# Patient Record
Sex: MALE | Race: WHITE | HISPANIC OR LATINO | Employment: PART TIME | URBAN - METROPOLITAN AREA
[De-identification: names, ages, dates, MRNs, and addresses within clinical notes are randomized per-mention and may not be internally consistent; named-entity substitution may affect disease eponyms.]

---

## 2022-09-14 ENCOUNTER — OFFICE VISIT (OUTPATIENT)
Dept: FAMILY MEDICINE CLINIC | Facility: CLINIC | Age: 37
End: 2022-09-14

## 2022-09-14 ENCOUNTER — APPOINTMENT (OUTPATIENT)
Dept: LAB | Facility: CLINIC | Age: 37
End: 2022-09-14
Payer: COMMERCIAL

## 2022-09-14 VITALS
HEART RATE: 55 BPM | BODY MASS INDEX: 28.76 KG/M2 | HEIGHT: 73 IN | WEIGHT: 217 LBS | RESPIRATION RATE: 18 BRPM | DIASTOLIC BLOOD PRESSURE: 78 MMHG | OXYGEN SATURATION: 96 % | SYSTOLIC BLOOD PRESSURE: 116 MMHG | TEMPERATURE: 97.9 F

## 2022-09-14 DIAGNOSIS — N50.89 TESTICULAR SWELLING: Primary | ICD-10-CM

## 2022-09-14 DIAGNOSIS — Z00.00 HEALTH CARE MAINTENANCE: ICD-10-CM

## 2022-09-14 DIAGNOSIS — N50.89 TESTICULAR SWELLING: ICD-10-CM

## 2022-09-14 LAB — HCV AB SER QL: NORMAL

## 2022-09-14 PROCEDURE — 86803 HEPATITIS C AB TEST: CPT

## 2022-09-14 PROCEDURE — 99213 OFFICE O/P EST LOW 20 MIN: CPT | Performed by: FAMILY MEDICINE

## 2022-09-14 PROCEDURE — 87389 HIV-1 AG W/HIV-1&-2 AB AG IA: CPT

## 2022-09-14 PROCEDURE — 86592 SYPHILIS TEST NON-TREP QUAL: CPT

## 2022-09-14 PROCEDURE — 36415 COLL VENOUS BLD VENIPUNCTURE: CPT

## 2022-09-14 NOTE — ASSESSMENT & PLAN NOTE
Painless swelling  Left greater than right  Negative for any hernia on examination      Plan  -will conduct urgent ultrasound  -obtain labs including urinalysis, RPR HIV syphilis  -will will have patient come back in a week and do cremaster reflex and follow-up on ultrasound

## 2022-09-14 NOTE — PROGRESS NOTES
Assessment/Plan:    1  Testicular swelling  Assessment & Plan:  Painless swelling  Left greater than right  Negative for any hernia on examination  Plan  -will conduct urgent ultrasound  -obtain labs including urinalysis, RPR HIV syphilis  -will will have patient come back in a week and do cremaster reflex and follow-up on ultrasound      Orders:  -     HIV 1/2 ANTIGEN/ANTIBODY (4TH GENERATION) W REFLEX [facility]; Future  -     Chlamydia/GC amplified DNA by PCR; Future  -     Hepatitis C antibody; Future  -     US scrotum and testicles; Future; Expected date: 09/14/2022    2  Health care maintenance  -   HIV  -hepatitis-C    Subjective:      Patient ID: Antwon Peguero is a 29 y o  male  Past medical history notable for bilateral inguinal hernia previously repaired 7 years ago common in for left painless testicular swelling meds been going on for a period of a week  Patient became concerned and came in for initial evaluation  Patient reports that have only 1 sexual partner  Patient denies any dysuria, blood in urine, urinary frequency  Patient denies painful ejaculations  Patient denies any discharge  The following portions of the patient's history were reviewed and updated as appropriate: allergies, current medications, past family history, past medical history, past social history, past surgical history, and problem list     Review of Systems   Constitutional: Negative for chills and fever  HENT: Negative for ear pain and sore throat  Eyes: Negative for pain and visual disturbance  Respiratory: Negative for cough and shortness of breath  Cardiovascular: Negative for chest pain and palpitations  Gastrointestinal: Negative for abdominal pain and vomiting  Genitourinary: Positive for scrotal swelling  Negative for decreased urine volume, dysuria, hematuria, penile discharge, penile pain, penile swelling, testicular pain and urgency     Musculoskeletal: Negative for arthralgias and back pain    Skin: Negative for color change and rash  Neurological: Negative for seizures and syncope  All other systems reviewed and are negative  Objective:      /78 (BP Location: Left arm, Patient Position: Sitting, Cuff Size: Adult)   Pulse 55   Temp 97 9 °F (36 6 °C) (Temporal)   Resp 18   Ht 6' 1" (1 854 m)   Wt 98 4 kg (217 lb)   SpO2 96%   BMI 28 63 kg/m²          Physical Exam  Constitutional:       General: He is not in acute distress  Appearance: Normal appearance  He is not toxic-appearing or diaphoretic  HENT:      Head: Normocephalic  Mouth/Throat:      Mouth: Mucous membranes are moist    Eyes:      Extraocular Movements: Extraocular movements intact  Pupils: Pupils are equal, round, and reactive to light  Cardiovascular:      Rate and Rhythm: Normal rate and regular rhythm  Pulmonary:      Effort: Pulmonary effort is normal  No tachypnea  Breath sounds: Normal breath sounds  No wheezing or rales  Chest:      Chest wall: No edema  There is no dullness to percussion  Abdominal:      General: Abdomen is flat  There is no distension  Palpations: Abdomen is soft  Tenderness: There is no abdominal tenderness  Genitourinary:     Penis: No hypospadias, erythema, tenderness, swelling or lesions  Testes:         Right: Mass or tenderness not present  Left: Swelling present  Mass or tenderness not present  Left testis is descended  Comments: Significant testicular swelling present on the left side more so than right  Musculoskeletal:      Right lower leg: No edema  Left lower leg: No edema  Skin:     Capillary Refill: Capillary refill takes less than 2 seconds  Neurological:      General: No focal deficit present  Mental Status: He is alert and oriented to person, place, and time     Psychiatric:         Mood and Affect: Mood normal          Behavior: Behavior normal            [facility], DO   Family Medicine PGY-1   9/14/2022

## 2022-09-15 ENCOUNTER — APPOINTMENT (OUTPATIENT)
Dept: LAB | Facility: CLINIC | Age: 37
End: 2022-09-15
Payer: COMMERCIAL

## 2022-09-15 ENCOUNTER — HOSPITAL ENCOUNTER (OUTPATIENT)
Dept: ULTRASOUND IMAGING | Facility: HOSPITAL | Age: 37
End: 2022-09-15
Payer: COMMERCIAL

## 2022-09-15 DIAGNOSIS — N50.89 TESTICULAR SWELLING: ICD-10-CM

## 2022-09-15 DIAGNOSIS — Z00.00 HEALTH CARE MAINTENANCE: ICD-10-CM

## 2022-09-15 LAB
HIV 1+2 AB+HIV1 P24 AG SERPL QL IA: NORMAL
RPR SER QL: NORMAL

## 2022-09-15 PROCEDURE — 81003 URINALYSIS AUTO W/O SCOPE: CPT | Performed by: STUDENT IN AN ORGANIZED HEALTH CARE EDUCATION/TRAINING PROGRAM

## 2022-09-15 PROCEDURE — 87591 N.GONORRHOEAE DNA AMP PROB: CPT

## 2022-09-15 PROCEDURE — 76870 US EXAM SCROTUM: CPT

## 2022-09-15 PROCEDURE — 87491 CHLMYD TRACH DNA AMP PROBE: CPT

## 2022-09-15 PROCEDURE — 87661 TRICHOMONAS VAGINALIS AMPLIF: CPT

## 2022-09-16 LAB
BILIRUB UR QL STRIP: NEGATIVE
C TRACH DNA SPEC QL NAA+PROBE: NEGATIVE
CLARITY UR: CLEAR
COLOR UR: COLORLESS
GLUCOSE UR STRIP-MCNC: NEGATIVE MG/DL
HGB UR QL STRIP.AUTO: NEGATIVE
KETONES UR STRIP-MCNC: NEGATIVE MG/DL
LEUKOCYTE ESTERASE UR QL STRIP: NEGATIVE
N GONORRHOEA DNA SPEC QL NAA+PROBE: NEGATIVE
NITRITE UR QL STRIP: NEGATIVE
PH UR STRIP.AUTO: 6 [PH]
PROT UR STRIP-MCNC: NEGATIVE MG/DL
SP GR UR STRIP.AUTO: 1.01 (ref 1–1.03)
UROBILINOGEN UR STRIP-ACNC: <2 MG/DL

## 2022-09-21 LAB — T VAGINALIS RRNA SPEC QL NAA+PROBE: NEGATIVE

## 2022-10-13 ENCOUNTER — TELEPHONE (OUTPATIENT)
Dept: FAMILY MEDICINE CLINIC | Facility: CLINIC | Age: 37
End: 2022-10-13

## 2022-10-13 NOTE — TELEPHONE ENCOUNTER
I have attempted to contact this patient by phone with the following results: left message to return my call on answering machine  Called to confirm appointment but voicemail was full

## 2022-10-14 ENCOUNTER — OFFICE VISIT (OUTPATIENT)
Dept: FAMILY MEDICINE CLINIC | Facility: CLINIC | Age: 37
End: 2022-10-14

## 2022-10-14 DIAGNOSIS — N50.89 TESTICULAR SWELLING: Primary | ICD-10-CM

## 2022-10-14 DIAGNOSIS — Z23 ENCOUNTER FOR IMMUNIZATION: ICD-10-CM

## 2022-10-14 DIAGNOSIS — N52.9 ERECTILE DYSFUNCTION, UNSPECIFIED ERECTILE DYSFUNCTION TYPE: ICD-10-CM

## 2022-10-14 PROCEDURE — 90472 IMMUNIZATION ADMIN EACH ADD: CPT | Performed by: FAMILY MEDICINE

## 2022-10-14 PROCEDURE — 90682 RIV4 VACC RECOMBINANT DNA IM: CPT | Performed by: FAMILY MEDICINE

## 2022-10-14 PROCEDURE — 90471 IMMUNIZATION ADMIN: CPT | Performed by: FAMILY MEDICINE

## 2022-10-14 PROCEDURE — 99213 OFFICE O/P EST LOW 20 MIN: CPT | Performed by: FAMILY MEDICINE

## 2022-10-14 PROCEDURE — 90715 TDAP VACCINE 7 YRS/> IM: CPT | Performed by: FAMILY MEDICINE

## 2022-10-14 RX ORDER — SILDENAFIL 25 MG/1
25 TABLET, FILM COATED ORAL DAILY PRN
Qty: 90 TABLET | Refills: 3 | Status: SHIPPED | OUTPATIENT
Start: 2022-10-14

## 2022-10-15 PROBLEM — Z23 ENCOUNTER FOR IMMUNIZATION: Status: ACTIVE | Noted: 2022-10-15

## 2022-10-15 NOTE — PROGRESS NOTES
Assessment/Plan:    1  Testicular swelling  Assessment & Plan:  Ultrasound positive for Prominent bilateral epididymal cysts    Small bilateral hydroceles with particulate debris  Symptomatic with episodic pain  Now erectile dysfunction though not related  Plan  -that this time appropriate to put in for urology referral for further evaluation and recommendations  -will give patient 5 aggregate in the interim to help with the erectile dysfunction  -strict return precaution including significant testicular pain/abdominal pain given to patient request setting of the cysts    Orders:  -     Ambulatory Referral to Urology; Future    2  Erectile dysfunction, unspecified erectile dysfunction type  -     sildenafil (VIAGRA) 25 MG tablet; Take 1 tablet (25 mg total) by mouth daily as needed for erectile dysfunction    3  Encounter for immunization  -     TDAP VACCINE GREATER THAN OR EQUAL TO 8YO IM  -     influenza vaccine, quadrivalent, recombinant, PF, 0 5 mL, for patients 18 yr+ (FLUBLOK)       Subjective:      Patient ID: Tanner Augustine is a 29 y o  male  No significant past medical history come in with follow-up visit regarding swelling of his testicles  Per last visit, we obtained workup including ultrasound, STD checks, and urine analysis  STD checks and urinalysis were negative  Ultrasound was positive for   Prominent bilateral epididymal cysts  Small bilateral hydroceles with particulate debris  Patient reports that he has had increasing episodes of brief pain in his testicles  That are sharp however subsiding  Patient also reports that he has had erectile dysfunction though he continues to have morning erections  Patient denies any fevers, chills or any current testicular pain  Patient denies any unintentional weight loss        The following portions of the patient's history were reviewed and updated as appropriate: allergies, current medications, past family history, past medical history, past social history, past surgical history, and problem list     Review of Systems   Constitutional: Negative for chills and fever  HENT: Negative for ear pain and sore throat  Eyes: Negative for pain and visual disturbance  Respiratory: Negative for cough and shortness of breath  Cardiovascular: Negative for chest pain and palpitations  Gastrointestinal: Negative for abdominal pain and vomiting  Genitourinary: Negative for dysuria and hematuria  Musculoskeletal: Negative for arthralgias and back pain  Skin: Negative for color change and rash  Neurological: Negative for seizures and syncope  All other systems reviewed and are negative  Objective: There were no vitals taken for this visit  Physical Exam  Constitutional:       General: He is not in acute distress  Appearance: Normal appearance  He is not toxic-appearing or diaphoretic  HENT:      Head: Normocephalic  Mouth/Throat:      Mouth: Mucous membranes are moist    Eyes:      Extraocular Movements: Extraocular movements intact  Pupils: Pupils are equal, round, and reactive to light  Cardiovascular:      Rate and Rhythm: Normal rate and regular rhythm  Pulmonary:      Effort: Pulmonary effort is normal  No tachypnea  Breath sounds: Normal breath sounds  No wheezing or rales  Chest:      Chest wall: No edema  There is no dullness to percussion  Abdominal:      General: Abdomen is flat  There is no distension  Palpations: Abdomen is soft  Tenderness: There is no abdominal tenderness  Musculoskeletal:      Right lower leg: No edema  Left lower leg: No edema  Skin:     Capillary Refill: Capillary refill takes less than 2 seconds  Neurological:      General: No focal deficit present  Mental Status: He is alert and oriented to person, place, and time     Psychiatric:         Mood and Affect: Mood normal          Behavior: Behavior normal            Kimberli STEWART Family Medicine PGY-1   10/21/2022

## 2022-10-15 NOTE — ASSESSMENT & PLAN NOTE
Ultrasound positive for Prominent bilateral epididymal cysts    Small bilateral hydroceles with particulate debris  Symptomatic with episodic pain  Now erectile dysfunction though not related      Plan  -that this time appropriate to put in for urology referral for further evaluation and recommendations  -will give patient 5 aggregate in the interim to help with the erectile dysfunction  -strict return precaution including significant testicular pain/abdominal pain given to patient request setting of the cysts

## 2022-10-21 PROBLEM — N52.9 ERECTILE DYSFUNCTION: Status: ACTIVE | Noted: 2022-10-21

## 2024-02-19 ENCOUNTER — HOSPITAL ENCOUNTER (EMERGENCY)
Facility: HOSPITAL | Age: 39
Discharge: HOME | End: 2024-02-19
Attending: EMERGENCY MEDICINE

## 2024-02-19 VITALS
WEIGHT: 220.3 LBS | TEMPERATURE: 97.9 F | SYSTOLIC BLOOD PRESSURE: 158 MMHG | BODY MASS INDEX: 29.07 KG/M2 | HEART RATE: 64 BPM | OXYGEN SATURATION: 98 % | DIASTOLIC BLOOD PRESSURE: 86 MMHG | RESPIRATION RATE: 20 BRPM

## 2024-02-19 DIAGNOSIS — S01.81XA FACIAL LACERATION, INITIAL ENCOUNTER: Primary | ICD-10-CM

## 2024-02-19 PROCEDURE — 99282 EMERGENCY DEPT VISIT SF MDM: CPT

## 2024-02-19 PROCEDURE — 90715 TDAP VACCINE 7 YRS/> IM: CPT | Performed by: EMERGENCY MEDICINE

## 2024-02-19 PROCEDURE — 99284 EMERGENCY DEPT VISIT MOD MDM: CPT | Performed by: EMERGENCY MEDICINE

## 2024-02-19 PROCEDURE — 90471 IMMUNIZATION ADMIN: CPT

## 2024-02-19 PROCEDURE — 12011 RPR F/E/E/N/L/M 2.5 CM/<: CPT | Performed by: EMERGENCY MEDICINE

## 2024-02-19 RX ORDER — TETRACAINE HYDROCHLORIDE 5 MG/ML
2 SOLUTION OPHTHALMIC ONCE
Status: COMPLETED | OUTPATIENT
Start: 2024-02-19 | End: 2024-02-19

## 2024-02-19 RX ADMIN — TETANUS TOXOID, REDUCED DIPHTHERIA TOXOID AND ACELLULAR PERTUSSIS VACCINE, ADSORBED 0.5 ML: 5; 2.5; 8; 8; 2.5 SUSPENSION INTRAMUSCULAR at 19:26

## 2024-02-19 RX ADMIN — TETRACAINE HYDROCHLORIDE 2 DROP: 5 SOLUTION OPHTHALMIC at 19:21

## 2024-02-19 RX ADMIN — FLUORESCEIN SODIUM 1 STRIP: 1 STRIP OPHTHALMIC at 19:21

## 2024-02-20 NOTE — ED PROCEDURE NOTE
"PROCEDURE  Universal Protocol:  Consent: Verbal consent obtained.  Risks and benefits: risks, benefits and alternatives were discussed  Consent given by: patient  Time out: Immediately prior to procedure a \"time out\" was called to verify the correct patient, procedure, equipment, support staff and site/side marked as required.  Timeout called at: 2/19/2024 7:21 PM.  Patient understanding: patient states understanding of the procedure being performed  Patient identity confirmed: verbally with patient  Laceration repair    Date/Time: 2/19/2024 7:21 PM    Performed by: Rai Cuevas MD  Authorized by: Rai Cuevas MD  Body area: head/neck  Location details: forehead  Laceration length: 0.5 cm  Tendon involvement: none  Nerve involvement: none  Vascular damage: no    Sedation:  Patient sedated: no      Wound Dehiscence:  Superficial Wound Dehiscence: simple closure      Procedure Details:  Irrigation solution: saline  Irrigation method: syringe  Amount of cleaning: standard  Debridement: none  Degree of undermining: none  Skin closure: glue  Patient tolerance: Patient tolerated the procedure well with no immediate complications           Rai Cuevas MD  02/19/24 1921    "

## 2024-02-20 NOTE — ED PROVIDER NOTES
History  Chief Complaint   Patient presents with    Head Injury     Struck in head with piece of metal at home.  No LOC.     Patient is a 38-year-old male who presents at the request of his wife.  Moving items out of the basement today.  Piece of metal struck him in the face.  +laceration.  Last tetanus unknown.  +FB sensation in eye.  No issues with vision.  No n/v, headache, numbness/tingling.         Prior to Admission Medications   Prescriptions Last Dose Informant Patient Reported? Taking?   sildenafil (VIAGRA) 25 MG tablet   No No   Sig: Take 1 tablet (25 mg total) by mouth daily as needed for erectile dysfunction      Facility-Administered Medications: None       No past medical history on file.    Past Surgical History:   Procedure Laterality Date    HERNIA REPAIR Bilateral        Family History   Problem Relation Age of Onset    Heart disease Mother      I have reviewed and agree with the history as documented.    E-Cigarette/Vaping    E-Cigarette Use Never User      E-Cigarette/Vaping Substances     Social History     Tobacco Use    Smoking status: Never    Smokeless tobacco: Never   Vaping Use    Vaping status: Never Used   Substance Use Topics    Alcohol use: Never    Drug use: Never       Review of Systems   Constitutional: Negative.    HENT: Negative.     Eyes:  Positive for pain.   Respiratory: Negative.     Cardiovascular: Negative.    Gastrointestinal: Negative.    Endocrine: Negative.    Genitourinary: Negative.    Musculoskeletal: Negative.    Skin:  Positive for wound.   Allergic/Immunologic: Negative.    Neurological: Negative.    Hematological: Negative.    Psychiatric/Behavioral: Negative.     All other systems reviewed and are negative.      Physical Exam  Physical Exam  Vitals and nursing note reviewed.   Constitutional:       Appearance: Normal appearance. He is normal weight.   HENT:      Head: Normocephalic and atraumatic.      Right Ear: Tympanic membrane, ear canal and external ear  normal.      Left Ear: Tympanic membrane, ear canal and external ear normal.      Nose: Nose normal.      Mouth/Throat:      Mouth: Mucous membranes are moist.      Pharynx: Oropharynx is clear.   Eyes:      General:         Right eye: No foreign body or discharge.         Left eye: No foreign body or discharge.      Extraocular Movements: Extraocular movements intact.      Conjunctiva/sclera: Conjunctivae normal.      Right eye: Right conjunctiva is not injected. No chemosis, exudate or hemorrhage.     Pupils: Pupils are equal, round, and reactive to light.      Comments: Stained with fluro.  No corneal abrasion.    Cardiovascular:      Rate and Rhythm: Normal rate and regular rhythm.      Pulses: Normal pulses.      Heart sounds: Normal heart sounds.   Pulmonary:      Breath sounds: Normal breath sounds.   Abdominal:      General: Bowel sounds are normal.      Palpations: Abdomen is soft.   Musculoskeletal:      Cervical back: Normal range of motion and neck supple.   Skin:     Comments: 0.5 cm linear laceration with minimal gap lateral to right lateral canthus.    Neurological:      Mental Status: He is alert.         Vital Signs  ED Triage Vitals [02/19/24 1900]   Temperature Pulse Respirations Blood Pressure SpO2   97.9 °F (36.6 °C) 64 20 158/86 98 %      Temp Source Heart Rate Source Patient Position - Orthostatic VS BP Location FiO2 (%)   Tympanic Monitor Sitting Left arm --      Pain Score       --           Vitals:    02/19/24 1900   BP: 158/86   Pulse: 64   Patient Position - Orthostatic VS: Sitting         Visual Acuity  Visual Acuity      Flowsheet Row Most Recent Value   L Pupil Size (mm) 3   R Pupil Size (mm) 3            ED Medications  Medications   fluorescein sodium sterile ophthalmic strip 1 strip (1 strip Right Eye Given 2/19/24 1921)   tetracaine 0.5 % ophthalmic solution 2 drop (2 drops Right Eye Given 2/19/24 1921)   tetanus-diphtheria-acellular pertussis (BOOSTRIX) IM injection 0.5 mL (0.5 mL  Intramuscular Given 2/19/24 1926)       Diagnostic Studies  Results Reviewed       None                   No orders to display              Procedures  Procedures         ED Course                                             Medical Decision Making  Problems Addressed:  Facial laceration, initial encounter:     Details: Repaired.  Tetanus provided.     Risk  Prescription drug management.             Disposition  Final diagnoses:   Facial laceration, initial encounter     Time reflects when diagnosis was documented in both MDM as applicable and the Disposition within this note       Time User Action Codes Description Comment    2/19/2024  7:21 PM Rai Cuevas Add [S01.81XA] Facial laceration, initial encounter           ED Disposition       ED Disposition   Discharge    Condition   Stable    Date/Time   Mon Feb 19, 2024  7:21 PM    Comment   Kenyetta Mookie discharge to home/self care.                   Follow-up Information       Follow up With Specialties Details Why Contact Info Additional Information    [address] [address] [phone] [facility], [address] [place] [address], [address] [phone]            Discharge Medication List as of 2/19/2024  7:21 PM        CONTINUE these medications which have NOT CHANGED    Details   sildenafil (VIAGRA) 25 MG tablet Take 1 tablet (25 mg total) by mouth daily as needed for erectile dysfunction, Starting Fri 10/14/2022, Normal             No discharge procedures on file.    PDMP Review       None            ED Provider  Electronically Signed by             Rai Cuevas MD  02/19/24 1951

## 2024-09-30 ENCOUNTER — APPOINTMENT (EMERGENCY)
Dept: RADIOLOGY | Facility: HOSPITAL | Age: 39
End: 2024-09-30
Payer: COMMERCIAL

## 2024-09-30 ENCOUNTER — HOSPITAL ENCOUNTER (EMERGENCY)
Facility: HOSPITAL | Age: 39
Discharge: HOME | End: 2024-09-30
Attending: EMERGENCY MEDICINE | Admitting: EMERGENCY MEDICINE
Payer: COMMERCIAL

## 2024-09-30 VITALS
RESPIRATION RATE: 18 BRPM | DIASTOLIC BLOOD PRESSURE: 81 MMHG | TEMPERATURE: 97.6 F | SYSTOLIC BLOOD PRESSURE: 163 MMHG | BODY MASS INDEX: 29.16 KG/M2 | WEIGHT: 221 LBS | OXYGEN SATURATION: 99 % | HEART RATE: 73 BPM

## 2024-09-30 DIAGNOSIS — S60.00XA FINGER CONTUSION: Primary | ICD-10-CM

## 2024-09-30 PROCEDURE — 99284 EMERGENCY DEPT VISIT MOD MDM: CPT | Performed by: EMERGENCY MEDICINE

## 2024-09-30 PROCEDURE — 73140 X-RAY EXAM OF FINGER(S): CPT

## 2024-09-30 PROCEDURE — 99283 EMERGENCY DEPT VISIT LOW MDM: CPT

## 2024-10-02 NOTE — ED PROVIDER NOTES
Final diagnoses:   Finger contusion     ED Disposition       ED Disposition   Discharge    Condition   Stable    Date/Time   Mon Sep 30, 2024 11:03 PM    Comment   Kenyetta Damico discharge to home/self care.                   Assessment & Plan       Medical Decision Making  38-year-old male presenting emergency department with right pinky, differential diagnose includes fracture, dislocation.  X-ray of the right pinky shows no fracture on my interpretation.  Patient not having a special pain, offered splint but refused.    Amount and/or Complexity of Data Reviewed  Radiology: ordered.             Medications - No data to display    ED Risk Strat Scores                                               History of Present Illness       Chief Complaint   Patient presents with    Finger Injury     Pt reports slamming door on R hand 5th digit on accident.  Area is bruised and swollen.        History reviewed. No pertinent past medical history.   Past Surgical History:   Procedure Laterality Date    HERNIA REPAIR Bilateral       Family History   Problem Relation Age of Onset    Heart disease Mother       Social History     Tobacco Use    Smoking status: Never    Smokeless tobacco: Never   Vaping Use    Vaping status: Never Used   Substance Use Topics    Alcohol use: Never    Drug use: Never      E-Cigarette/Vaping    E-Cigarette Use Never User       E-Cigarette/Vaping Substances      I have reviewed and agree with the history as documented.     38-year-old male presenting to the emergency department with right pinky pain from having slammed it in a door.  Bruised and swollen.        Review of Systems   Constitutional:  Negative for chills and fever.   HENT:  Negative for ear pain and sore throat.    Eyes:  Negative for pain and visual disturbance.   Respiratory:  Negative for cough and shortness of breath.    Cardiovascular:  Negative for chest pain and palpitations.   Gastrointestinal:  Negative for abdominal pain and  vomiting.   Genitourinary:  Negative for dysuria and hematuria.   Musculoskeletal:  Positive for arthralgias. Negative for back pain.   Skin:  Negative for color change and rash.   Neurological:  Negative for seizures and syncope.   All other systems reviewed and are negative.          Objective       ED Triage Vitals [09/30/24 2238]   Temperature Pulse Blood Pressure Respirations SpO2 Patient Position - Orthostatic VS   97.6 °F (36.4 °C) 73 163/81 18 99 % Sitting      Temp Source Heart Rate Source BP Location FiO2 (%) Pain Score    Tympanic Monitor Left arm -- --      Vitals      Date and Time Temp Pulse SpO2 Resp BP Pain Score FACES Pain Rating User   09/30/24 2238 97.6 °F (36.4 °C) 73 99 % 18 163/81 -- -- TR            Physical Exam  Vitals and nursing note reviewed.   Constitutional:       General: He is not in acute distress.     Appearance: He is well-developed.   HENT:      Head: Normocephalic and atraumatic.   Eyes:      Conjunctiva/sclera: Conjunctivae normal.   Pulmonary:      Effort: Pulmonary effort is normal. No respiratory distress.   Abdominal:      Palpations: Abdomen is soft.      Tenderness: There is no abdominal tenderness.   Musculoskeletal:      Cervical back: Neck supple.      Comments: Right fifth digit with distal phalanx swelling and ecchymosis.  No subungual hematoma.   Skin:     General: Skin is warm and dry.      Capillary Refill: Capillary refill takes less than 2 seconds.   Neurological:      Mental Status: He is alert.         Results Reviewed       None            XR finger fifth digit-pinkie RIGHT   Final Interpretation by Faizan Cartwright MD (10/01 0900)      Acute nondisplaced fracture involving the distal phalanx of the fifth digit.         Computerized Assisted Algorithm (CAA) may have been used to analyze all applicable images.               Workstation performed: [id number]             Procedures    ED Medication and Procedure Management   Prior to Admission Medications    Prescriptions Last Dose Informant Patient Reported? Taking?   sildenafil (VIAGRA) 25 MG tablet   No No   Sig: Take 1 tablet (25 mg total) by mouth daily as needed for erectile dysfunction      Facility-Administered Medications: None     Discharge Medication List as of 9/30/2024 11:03 PM        CONTINUE these medications which have NOT CHANGED    Details   sildenafil (VIAGRA) 25 MG tablet Take 1 tablet (25 mg total) by mouth daily as needed for erectile dysfunction, Starting Fri 10/14/2022, Normal           No discharge procedures on file.  ED SEPSIS DOCUMENTATION   Time reflects when diagnosis was documented in both MDM as applicable and the Disposition within this note       Time User Action Codes Description Comment    9/30/2024 11:03 PM Matty Perry Add [S60.00XA] Finger contusion                  Matty Perry MD  10/01/24 7403

## 2025-01-22 ENCOUNTER — TELEPHONE (OUTPATIENT)
Dept: FAMILY MEDICINE CLINIC | Facility: CLINIC | Age: 40
End: 2025-01-22

## 2025-01-23 ENCOUNTER — OFFICE VISIT (OUTPATIENT)
Dept: FAMILY MEDICINE CLINIC | Facility: CLINIC | Age: 40
End: 2025-01-23

## 2025-01-23 VITALS
SYSTOLIC BLOOD PRESSURE: 110 MMHG | TEMPERATURE: 96.9 F | RESPIRATION RATE: 14 BRPM | HEIGHT: 72 IN | HEART RATE: 70 BPM | DIASTOLIC BLOOD PRESSURE: 60 MMHG | BODY MASS INDEX: 30.85 KG/M2 | WEIGHT: 227.8 LBS | OXYGEN SATURATION: 96 %

## 2025-01-23 DIAGNOSIS — Z11.4 SCREENING FOR HIV (HUMAN IMMUNODEFICIENCY VIRUS): ICD-10-CM

## 2025-01-23 DIAGNOSIS — N50.89 TESTICULAR SWELLING: ICD-10-CM

## 2025-01-23 DIAGNOSIS — Z00.00 ANNUAL PHYSICAL EXAM: Primary | ICD-10-CM

## 2025-01-23 DIAGNOSIS — E66.9 OBESITY (BMI 30-39.9): ICD-10-CM

## 2025-01-23 DIAGNOSIS — Z79.899 ENCOUNTER FOR MEDICATION REVIEW: ICD-10-CM

## 2025-01-23 DIAGNOSIS — Z11.59 NEED FOR HEPATITIS C SCREENING TEST: ICD-10-CM

## 2025-01-23 PROBLEM — Z23 ENCOUNTER FOR IMMUNIZATION: Status: RESOLVED | Noted: 2022-10-15 | Resolved: 2025-01-23

## 2025-01-23 LAB
SL AMB  POCT GLUCOSE, UA: NORMAL
SL AMB LEUKOCYTE ESTERASE,UA: NORMAL
SL AMB POCT BILIRUBIN,UA: NORMAL
SL AMB POCT BLOOD,UA: NORMAL
SL AMB POCT CLARITY,UA: CLEAR
SL AMB POCT COLOR,UA: YELLOW
SL AMB POCT KETONES,UA: NORMAL
SL AMB POCT NITRITE,UA: NORMAL
SL AMB POCT PH,UA: 5.5
SL AMB POCT SPECIFIC GRAVITY,UA: 1.03
SL AMB POCT URINE PROTEIN: NORMAL
SL AMB POCT UROBILINOGEN: 0.2

## 2025-01-23 PROCEDURE — 99395 PREV VISIT EST AGE 18-39: CPT | Performed by: FAMILY MEDICINE

## 2025-01-23 PROCEDURE — 81002 URINALYSIS NONAUTO W/O SCOPE: CPT | Performed by: FAMILY MEDICINE

## 2025-01-23 PROCEDURE — 99213 OFFICE O/P EST LOW 20 MIN: CPT | Performed by: FAMILY MEDICINE

## 2025-01-23 NOTE — PROGRESS NOTES
Adult Annual Physical  Name: Kenyetta Damico      : 1985      MRN: [id number]  Encounter Provider: Elba Yadav MD  Encounter Date: 2025   Encounter department: [facility]    Assessment & Plan  Annual physical exam  Has not seen PCP since        Testicular swelling  Bilateral inguinal hernia repair in , and the other .  Ultrasound in  : positive for Prominent bilateral epididymal cysts.  Small bilateral hydroceles with particulate debris.  Referral in  to urology but never went.   Increased in size recently     - repeat US to see for changes   - referral to urology   - given complain of urgency will do urine dipstick: negative.   - educated on potential need of surgery  - advised to wear briefs with extra support   - patient to follow up if symptoms worsen      Orders:    US scrotum and testicles; Future    POCT urine dip    Ambulatory Referral to Urology; Future    Obesity (BMI 30-39.9)  The BMI is above normal.   Nutrition recommendations include reducing portion sizes, decreasing overall calorie intake, 3-5 servings of fruits/vegetables daily, reducing fast food intake, consuming healthier snacks, decreasing soda and/or juice intake, moderation in carbohydrate intake, increasing intake of lean protein, reducing intake of saturated fat and trans fat and reducing intake of cholesterol. Exercise recommendations include moderate aerobic physical activity for 150 minutes/week.      Orders:    Lipid panel; Future    Comprehensive metabolic panel; Future    Hemoglobin A1C; Future    Need for hepatitis C screening test  Last in , low risk given monogamous relationship with female partner but patient  requested testing   Orders:    Hepatitis C antibody; Future    Screening for HIV (human immunodeficiency virus)  Last in , low risk given monogamous relationship with female partner but patient  requested testing   Orders:    HIV 1/2 AG/AB  w Reflex [facility] for 2 yr old and above; Future    Encounter for medication review  Does not take any medications, including OTC meds and supplements        Immunizations and preventive care screenings were discussed with patient today.  Up to date with shots.   Counseling:  Alcohol/drug use: discussed moderation in alcohol intake, the recommendations for healthy alcohol use, and avoidance of illicit drug use.         History of Present Illness     Adult Annual Physical:  Patient presents for annual physical. 39-year-old male with no significant past medical history presents today for annual checkup as well as follow-up on left testicle swelling.  This is a chronic condition for him patient was seen for the same complaint in 2022 when he was referred to urology but did not follow-up with them.   Reports swelling on left testicle associated with intermittent pain and intermittent erythema that has noticed to be worse when standing for prolonged period of time, patient is also feels pain after intercourse he does mention urgency as well.   Patient states that he is unable to maintain erection and was prescribed Viagra in the past however has never used it.  He thinks it is related to the swelling. .     Diet and Physical Activity:  - Diet/Nutrition: well balanced diet and frequent junk food.  - Exercise: no formal exercise.    Depression Screening:  - PHQ-2 Score: 0    General Health:  - Sleep: sleeps well and 4-6 hours of sleep on average.  - Hearing: normal hearing right ear and normal hearing left ear.  - Vision: no vision problems.  - Dental: brushes teeth twice daily and no dental visits for > 1 year.     Health:  - History of STDs: no.   - Urinary symptoms: urinary urgency.     Advanced Care Planning:  - Has an advanced directive?: no    - Has a durable medical POA?: no      Review of Systems   Constitutional:  Negative for chills, diaphoresis, fatigue and fever.   HENT:  Negative for congestion and sore throat.  "   Eyes:  Negative for visual disturbance.   Respiratory:  Negative for shortness of breath and wheezing.    Cardiovascular:  Negative for chest pain, palpitations and leg swelling.   Gastrointestinal:  Negative for blood in stool, constipation, diarrhea, nausea and vomiting.   Genitourinary:  Positive for scrotal swelling, testicular pain and urgency. Negative for difficulty urinating, dysuria, enuresis, frequency, hematuria, penile pain and penile swelling.   Musculoskeletal:  Negative for arthralgias, back pain, joint swelling and myalgias.   Skin:  Negative for color change and rash.   Neurological:  Negative for dizziness, tremors, syncope, light-headedness and headaches.   Psychiatric/Behavioral:  Negative for sleep disturbance and suicidal ideas.          Objective   /60 (BP Location: Left arm, Patient Position: Sitting, Cuff Size: Standard)   Pulse 70   Temp (!) 96.9 °F (36.1 °C) (Temporal)   Resp 14   Ht 5' 11.65\" (1.82 m)   Wt 103 kg (227 lb 12.8 oz)   SpO2 96%   BMI 31.19 kg/m²     Physical Exam  Vitals and nursing note reviewed. Exam conducted with a chaperone present.   Constitutional:       General: He is not in acute distress.     Appearance: He is well-developed.   HENT:      Head: Normocephalic and atraumatic.      Right Ear: Tympanic membrane, ear canal and external ear normal.      Left Ear: Tympanic membrane, ear canal and external ear normal.      Nose: Nose normal. No congestion.      Mouth/Throat:      Mouth: Mucous membranes are moist.   Eyes:      General: No scleral icterus.     Conjunctiva/sclera: Conjunctivae normal.   Cardiovascular:      Rate and Rhythm: Normal rate and regular rhythm.      Heart sounds: No murmur heard.  Pulmonary:      Effort: Pulmonary effort is normal. No respiratory distress.      Breath sounds: Normal breath sounds. No wheezing or rales.   Abdominal:      General: There is no distension.      Palpations: Abdomen is soft. There is no mass.      " Tenderness: There is no abdominal tenderness. There is no guarding.      Hernia: No hernia is present. There is no hernia in the left inguinal area or right inguinal area.   Genitourinary:     Penis: Normal. No tenderness or swelling.       Testes:         Right: Mass, tenderness, swelling or testicular hydrocele not present. Right testis is descended.         Left: Tenderness and testicular hydrocele present. Varicocele not present.   Musculoskeletal:         General: No swelling.      Cervical back: Neck supple. No rigidity or tenderness.      Right lower leg: No edema.      Left lower leg: No edema.   Lymphadenopathy:      Cervical: No cervical adenopathy.      Lower Body: No right inguinal adenopathy. No left inguinal adenopathy.   Skin:     General: Skin is warm and dry.      Capillary Refill: Capillary refill takes less than 2 seconds.      Coloration: Skin is not jaundiced or pale.      Findings: No erythema or rash.   Neurological:      Mental Status: He is alert.   Psychiatric:         Mood and Affect: Mood normal.

## 2025-01-23 NOTE — PATIENT INSTRUCTIONS
"Patient Education     Routine physical for adults   The Basics   Written by the doctors and editors at [facility]   What is a physical? -- A physical is a routine visit, or \"check-up,\" with your doctor. You might also hear it called a \"wellness visit\" or \"preventive visit.\"  During each visit, the doctor will:   Ask about your physical and mental health   Ask about your habits, behaviors, and lifestyle   Do an exam   Give you vaccines if needed   Talk to you about any medicines you take   Give advice about your health   Answer your questions  Getting regular check-ups is an important part of taking care of your health. It can help your doctor find and treat any problems you have. But it's also important for preventing health problems.  A routine physical is different from a \"sick visit.\" A sick visit is when you see a doctor because of a health concern or problem. Since physicals are scheduled ahead of time, you can think about what you want to ask the doctor.  How often should I get a physical? -- It depends on your age and health. In general, for people age 21 years and older:   If you are younger than 50 years, you might be able to get a physical every 3 years.   If you are 50 years or older, your doctor might recommend a physical every year.  If you have an ongoing health condition, like diabetes or high blood pressure, your doctor will probably want to see you more often.  What happens during a physical? -- In general, each visit will include:   Physical exam - The doctor or nurse will check your height, weight, heart rate, and blood pressure. They will also look at your eyes and ears. They will ask about how you are feeling and whether you have any symptoms that bother you.   Medicines - It's a good idea to bring a list of all the medicines you take to each doctor visit. Your doctor will talk to you about your medicines and answer any questions. Tell them if you are having any side effects that bother you. You " "should also tell them if you are having trouble paying for any of your medicines.   Habits and behaviors - This includes:   Your diet   Your exercise habits   Whether you smoke, drink alcohol, or use drugs   Whether you are sexually active   Whether you feel safe at home  Your doctor will talk to you about things you can do to improve your health and lower your risk of health problems. They will also offer help and support. For example, if you want to quit smoking, they can give you advice and might prescribe medicines. If you want to improve your diet or get more physical activity, they can help you with this, too.   Lab tests, if needed - The tests you get will depend on your age and situation. For example, your doctor might want to check your:   Cholesterol   Blood sugar   Iron level   Vaccines - The recommended vaccines will depend on your age, health, and what vaccines you already had. Vaccines are very important because they can prevent certain serious or deadly infections.   Discussion of screening - \"Screening\" means checking for diseases or other health problems before they cause symptoms. Your doctor can recommend screening based on your age, risk, and preferences. This might include tests to check for:   Cancer, such as breast, prostate, cervical, ovarian, colorectal, prostate, lung, or skin cancer   Sexually transmitted infections, such as chlamydia and gonorrhea   Mental health conditions like depression and anxiety  Your doctor will talk to you about the different types of screening tests. They can help you decide which screenings to have. They can also explain what the results might mean.   Answering questions - The physical is a good time to ask the doctor or nurse questions about your health. If needed, they can refer you to other doctors or specialists, too.  Adults older than 65 years often need other care, too. As you get older, your doctor will talk to you about:   How to prevent falling at " home   Hearing or vision tests   Memory testing   How to take your medicines safely   Making sure that you have the help and support you need at home  All topics are updated as new evidence becomes available and our peer review process is complete.  This topic retrieved from [employer] on: May 02, 2024.  Topic 159079 Version 1.0  Release: 32.4.3 - C32.122  © 2024 UpToDate, Inc. and/or its affiliates. All rights reserved.  Consumer Information Use and Disclaimer   Disclaimer: This generalized information is a limited summary of diagnosis, treatment, and/or medication information. It is not meant to be comprehensive and should be used as a tool to help the user understand and/or assess potential diagnostic and treatment options. It does NOT include all information about conditions, treatments, medications, side effects, or risks that may apply to a specific patient. It is not intended to be medical advice or a substitute for the medical advice, diagnosis, or treatment of a health care provider based on the health care provider's examination and assessment of a patient's specific and unique circumstances. Patients must speak with a health care provider for complete information about their health, medical questions, and treatment options, including any risks or benefits regarding use of medications. This information does not endorse any treatments or medications as safe, effective, or approved for treating a specific patient. UpToDate, Inc. and its affiliates disclaim any warranty or liability relating to this information or the use thereof.The use of this information is governed by the Terms of Use, available at https://www.[employer].com/en/know/clinical-effectiveness-terms. 2024© UpToDate, Inc. and its affiliates and/or licensors. All rights reserved.  Copyright   © 2024 UpToDate, Inc. and/or its affiliates. All rights reserved.

## 2025-01-23 NOTE — ASSESSMENT & PLAN NOTE
Bilateral inguinal hernia repair in 2009, and the other 2015.  Ultrasound in 2022 : positive for Prominent bilateral epididymal cysts.  Small bilateral hydroceles with particulate debris.  Referral in 2022 to urology but never went.   Increased in size recently     - repeat US to see for changes   - referral to urology   - given complain of urgency will do urine dipstick: negative.   - educated on potential need of surgery  - advised to wear briefs with extra support   - patient to follow up if symptoms worsen      Orders:    US scrotum and testicles; Future    POCT urine dip    Ambulatory Referral to Urology; Future

## 2025-01-24 ENCOUNTER — TELEPHONE (OUTPATIENT)
Age: 40
End: 2025-01-24

## 2025-01-24 ENCOUNTER — APPOINTMENT (OUTPATIENT)
Dept: LAB | Facility: CLINIC | Age: 40
End: 2025-01-24

## 2025-01-24 DIAGNOSIS — Z11.59 NEED FOR HEPATITIS C SCREENING TEST: ICD-10-CM

## 2025-01-24 DIAGNOSIS — E66.9 OBESITY (BMI 30-39.9): ICD-10-CM

## 2025-01-24 DIAGNOSIS — Z11.4 SCREENING FOR HIV (HUMAN IMMUNODEFICIENCY VIRUS): ICD-10-CM

## 2025-01-24 LAB
ALBUMIN SERPL BCG-MCNC: 4.6 G/DL (ref 3.5–5)
ALP SERPL-CCNC: 66 U/L (ref 34–104)
ALT SERPL W P-5'-P-CCNC: 29 U/L (ref 7–52)
ANION GAP SERPL CALCULATED.3IONS-SCNC: 6 MMOL/L (ref 4–13)
AST SERPL W P-5'-P-CCNC: 20 U/L (ref 13–39)
BILIRUB SERPL-MCNC: 0.58 MG/DL (ref 0.2–1)
BUN SERPL-MCNC: 14 MG/DL (ref 5–25)
CALCIUM SERPL-MCNC: 9.5 MG/DL (ref 8.4–10.2)
CHLORIDE SERPL-SCNC: 102 MMOL/L (ref 96–108)
CHOLEST SERPL-MCNC: 186 MG/DL (ref ?–200)
CO2 SERPL-SCNC: 30 MMOL/L (ref 21–32)
CREAT SERPL-MCNC: 0.87 MG/DL (ref 0.6–1.3)
EST. AVERAGE GLUCOSE BLD GHB EST-MCNC: 120 MG/DL
GFR SERPL CREATININE-BSD FRML MDRD: 109 ML/MIN/1.73SQ M
GLUCOSE P FAST SERPL-MCNC: 110 MG/DL (ref 65–99)
HBA1C MFR BLD: 5.8 %
HDLC SERPL-MCNC: 47 MG/DL
LDLC SERPL CALC-MCNC: 124 MG/DL (ref 0–100)
NONHDLC SERPL-MCNC: 139 MG/DL
POTASSIUM SERPL-SCNC: 4.5 MMOL/L (ref 3.5–5.3)
PROT SERPL-MCNC: 7.6 G/DL (ref 6.4–8.4)
SODIUM SERPL-SCNC: 138 MMOL/L (ref 135–147)
TRIGL SERPL-MCNC: 75 MG/DL (ref ?–150)

## 2025-01-24 PROCEDURE — 83036 HEMOGLOBIN GLYCOSYLATED A1C: CPT

## 2025-01-24 PROCEDURE — 36415 COLL VENOUS BLD VENIPUNCTURE: CPT

## 2025-01-24 PROCEDURE — 86803 HEPATITIS C AB TEST: CPT

## 2025-01-24 PROCEDURE — 80061 LIPID PANEL: CPT

## 2025-01-24 PROCEDURE — 80053 COMPREHEN METABOLIC PANEL: CPT

## 2025-01-24 PROCEDURE — 87389 HIV-1 AG W/HIV-1&-2 AB AG IA: CPT

## 2025-01-24 NOTE — TELEPHONE ENCOUNTER
NP referred by Dr. Yadav for testicular swelling, positive of prominent bilateral epididymal cysts and small bilateral hydroceles. I scheduled appointment for 1/27/2025 at 3:00pm.

## 2025-01-25 LAB
HCV AB SER QL: NORMAL
HIV 1+2 AB+HIV1 P24 AG SERPL QL IA: NORMAL
HIV 2 AB SERPL QL IA: NORMAL
HIV1 AB SERPL QL IA: NORMAL
HIV1 P24 AG SERPL QL IA: NORMAL

## 2025-01-27 ENCOUNTER — OFFICE VISIT (OUTPATIENT)
Dept: UROLOGY | Facility: MEDICAL CENTER | Age: 40
End: 2025-01-27

## 2025-01-27 ENCOUNTER — RESULTS FOLLOW-UP (OUTPATIENT)
Dept: FAMILY MEDICINE CLINIC | Facility: CLINIC | Age: 40
End: 2025-01-27

## 2025-01-27 VITALS
DIASTOLIC BLOOD PRESSURE: 80 MMHG | SYSTOLIC BLOOD PRESSURE: 110 MMHG | BODY MASS INDEX: 31.78 KG/M2 | WEIGHT: 227 LBS | HEIGHT: 71 IN

## 2025-01-27 DIAGNOSIS — N43.3 LEFT HYDROCELE: ICD-10-CM

## 2025-01-27 PROCEDURE — 99203 OFFICE O/P NEW LOW 30 MIN: CPT

## 2025-01-27 NOTE — TELEPHONE ENCOUNTER
Called patient , results discussed, patient is in prediabetic range.   Nutrition recommendations given:  reducing portion sizes, , reducing fast food intake decreasing soda and/or juice intake, moderation in carbohydrate intake, increase exercise and physical activity.   Follow up A1c in 1 year or sooner if symptoms evolve.

## 2025-01-27 NOTE — PROGRESS NOTES
Name: Kenyetta Damico      : 1985      MRN: [id number]  Encounter Provider: FARHAD Spangler  Encounter Date: 2025   [facility]  :  Assessment & Plan  Left hydrocele  -History of bilateral inguinal hernia repair in , and the other .  -Ultrasound in  demonstrated prominent bilateral epididymal cysts.  Small bilateral hydroceles with particulate debris.  -Physical exam today reveals a small left-sided hydrocele, no pain with palpation, no redness or induration.  -I discussed with the patient that hydroceles are rarely a cause for concern and usually treatment is supportive. He is interested in surgical intervention should it get any bigger. I do not recommend surgical intervention at this point.   -Continue with scrotal supportive underwear  and over-the-counter pain medications such as Tylenol and ibuprofen.  -Follow up in 6 months for symptom check       Orders:    Ambulatory Referral to Urology        History of Present Illness   Kenyetta Damico is a 39 y.o. male who presents for follow up of scrotal swelling. Pt states that he has left testicular swelling that he feels as though has gotten worse in the last 2 years.  He was seen by his PCP in  and an ultrasound demonstrated prominent bilateral epididymal cysts, small bilateral hydroceles.  He was recently seen by his PCP and ordered a repeat ultrasound for his left testicular swelling.  He did not obtain this yet.  He does report that the left side has gotten bigger.  He reports some mild pain in the left testicle intermittently, he has not tried any OTC pain meds for this.  He denies any urinary complaints or severe pain/redness.  He does report a history of bilateral inguinal hernia repairs  and .    Review of Systems   Constitutional:  Negative for chills and fever.   Gastrointestinal:  Negative for abdominal pain.   Genitourinary:  Negative for difficulty  "urinating, dysuria, flank pain, frequency, hematuria and urgency.          Objective   /80 (BP Location: Left arm, Patient Position: Sitting, Cuff Size: Adult)   Ht 5' 11\" (1.803 m)   Wt 103 kg (227 lb)   BMI 31.66 kg/m²     Physical Exam  Vitals reviewed.   Constitutional:       General: He is not in acute distress.     Appearance: Normal appearance. He is normal weight. He is not toxic-appearing.   HENT:      Head: Normocephalic and atraumatic.   Eyes:      Conjunctiva/sclera: Conjunctivae normal.   Cardiovascular:      Rate and Rhythm: Normal rate.   Pulmonary:      Effort: Pulmonary effort is normal.   Abdominal:      Palpations: Abdomen is soft.   Genitourinary:     Penis: Normal.       Comments: Uncircumcised penis, patent meatus. Small left sided hydrocele appreciated.   Musculoskeletal:         General: Normal range of motion.      Cervical back: Normal range of motion.   Skin:     General: Skin is warm and dry.   Neurological:      Mental Status: He is alert and oriented to person, place, and time.   Psychiatric:         Mood and Affect: Mood normal.         Behavior: Behavior normal.          Results  No results found for: \"PSA\"  Lab Results   Component Value Date    CALCIUM 9.5 01/24/2025    K 4.5 01/24/2025    CO2 30 01/24/2025     01/24/2025    BUN 14 01/24/2025    CREATININE 0.87 01/24/2025     No results found for: \"WBC\", \"HGB\", \"HCT\", \"MCV\", \"PLT\"    Office Urine Dip  No results found for this or any previous visit (from the past hour).]      "

## 2025-01-27 NOTE — ASSESSMENT & PLAN NOTE
-History of bilateral inguinal hernia repair in 2009, and the other 2015.  -Ultrasound in 2022 demonstrated prominent bilateral epididymal cysts.  Small bilateral hydroceles with particulate debris.  -Physical exam today reveals a small left-sided hydrocele, no pain with palpation, no redness or induration.  -I discussed with the patient that hydroceles are rarely a cause for concern and usually treatment is supportive. He is interested in surgical intervention should it get any bigger. I do not recommend surgical intervention at this point.   -Continue with scrotal supportive underwear  and over-the-counter pain medications such as Tylenol and ibuprofen.  -Follow up in 6 months for symptom check       Orders:    Ambulatory Referral to Urology

## 2025-02-03 ENCOUNTER — HOSPITAL ENCOUNTER (OUTPATIENT)
Dept: ULTRASOUND IMAGING | Facility: HOSPITAL | Age: 40
Discharge: HOME | End: 2025-02-03

## 2025-02-03 DIAGNOSIS — N50.89 TESTICULAR SWELLING: ICD-10-CM

## 2025-02-03 PROCEDURE — 76870 US EXAM SCROTUM: CPT

## 2025-02-28 ENCOUNTER — APPOINTMENT (OUTPATIENT)
Dept: LAB | Facility: HOSPITAL | Age: 40
End: 2025-02-28

## 2025-02-28 ENCOUNTER — OFFICE VISIT (OUTPATIENT)
Dept: FAMILY MEDICINE CLINIC | Facility: CLINIC | Age: 40
End: 2025-02-28

## 2025-02-28 VITALS
HEART RATE: 61 BPM | BODY MASS INDEX: 30.56 KG/M2 | RESPIRATION RATE: 14 BRPM | TEMPERATURE: 97.6 F | WEIGHT: 225.6 LBS | HEIGHT: 72 IN | DIASTOLIC BLOOD PRESSURE: 70 MMHG | SYSTOLIC BLOOD PRESSURE: 120 MMHG | OXYGEN SATURATION: 95 %

## 2025-02-28 DIAGNOSIS — Z11.3 SCREENING FOR STDS (SEXUALLY TRANSMITTED DISEASES): Primary | ICD-10-CM

## 2025-02-28 DIAGNOSIS — Z11.3 SCREENING FOR STDS (SEXUALLY TRANSMITTED DISEASES): ICD-10-CM

## 2025-02-28 PROCEDURE — 99213 OFFICE O/P EST LOW 20 MIN: CPT | Performed by: STUDENT IN AN ORGANIZED HEALTH CARE EDUCATION/TRAINING PROGRAM

## 2025-02-28 PROCEDURE — 86780 TREPONEMA PALLIDUM: CPT

## 2025-02-28 PROCEDURE — 87491 CHLMYD TRACH DNA AMP PROBE: CPT

## 2025-02-28 PROCEDURE — 87591 N.GONORRHOEAE DNA AMP PROB: CPT

## 2025-02-28 PROCEDURE — 36415 COLL VENOUS BLD VENIPUNCTURE: CPT

## 2025-03-01 LAB — TREPONEMA PALLIDUM IGG+IGM AB [PRESENCE] IN SERUM OR PLASMA BY IMMUNOASSAY: NORMAL

## 2025-03-02 NOTE — PROGRESS NOTES
"Name: Kenyetta Damico      : 1985      MRN: [id number]  Encounter Provider: Elba Yadav MD  Encounter Date: 2025   Encounter department: [facility]  :  Assessment & Plan  Screening for STDs (sexually transmitted diseases)  Per patient request.   Low risk sexual behavior   Monogamous female partner.   HIV and Hep C recent testing , negative.   Orders:    [facility]-Syphilis Screening (Total Syphilis IGG/IGM); Future    Chlamydia/GC amplified DNA by PCR; Future           History of Present Illness   38 yo male with hx of hydrocele presents in the office requesting STD teting, he was told by his partner to request STD testing complete, no other hx provided. Patient denies any discharge. Does not use condoms. No new sexual partners.         Review of Systems   Constitutional:  Negative for chills, fatigue and fever.   Eyes:  Negative for visual disturbance.   Gastrointestinal:  Negative for abdominal pain.   Genitourinary:  Positive for scrotal swelling. Negative for difficulty urinating, dysuria, flank pain, frequency, genital sores, hematuria, penile discharge, penile pain, penile swelling, testicular pain and urgency.   Musculoskeletal:  Negative for arthralgias, back pain and myalgias.       Objective   /70 (BP Location: Right arm, Patient Position: Sitting, Cuff Size: Standard)   Pulse 61   Temp 97.6 °F (36.4 °C) (Temporal)   Resp 14   Ht 5' 11.65\" (1.82 m)   Wt 102 kg (225 lb 9.6 oz)   SpO2 95%   BMI 30.90 kg/m²      Physical Exam  Vitals and nursing note reviewed.   Constitutional:       General: He is not in acute distress.     Appearance: He is well-developed. He is obese.   HENT:      Head: Normocephalic and atraumatic.   Eyes:      Conjunctiva/sclera: Conjunctivae normal.   Cardiovascular:      Rate and Rhythm: Normal rate and regular rhythm.      Heart sounds: No murmur heard.  Pulmonary:      Effort: Pulmonary effort is normal. No respiratory " distress.      Breath sounds: Normal breath sounds.   Abdominal:      Palpations: Abdomen is soft.      Tenderness: There is no abdominal tenderness.   Genitourinary:     Comments: Examined in previous visit where hydrocele was noted as confirmed by US.   Deferred today given negative hx.   Musculoskeletal:         General: No swelling.      Cervical back: Neck supple.   Skin:     General: Skin is warm and dry.      Capillary Refill: Capillary refill takes less than 2 seconds.   Neurological:      Mental Status: He is alert.   Psychiatric:         Mood and Affect: Mood normal.

## 2025-03-03 LAB
C TRACH DNA SPEC QL NAA+PROBE: NEGATIVE
N GONORRHOEA DNA SPEC QL NAA+PROBE: NEGATIVE

## 2025-03-06 ENCOUNTER — OFFICE VISIT (OUTPATIENT)
Dept: DENTISTRY | Facility: CLINIC | Age: 40
End: 2025-03-06

## 2025-03-06 VITALS — DIASTOLIC BLOOD PRESSURE: 83 MMHG | SYSTOLIC BLOOD PRESSURE: 122 MMHG | HEART RATE: 57 BPM

## 2025-03-06 DIAGNOSIS — K02.9 CARIES: Primary | ICD-10-CM

## 2025-03-06 DIAGNOSIS — K08.89 TOOTH PAIN WITH CHEWING: ICD-10-CM

## 2025-03-06 PROCEDURE — D0140 LIMITED ORAL EVALUATION - PROBLEM FOCUSED: HCPCS | Performed by: DENTIST

## 2025-03-06 PROCEDURE — D0330 PANORAMIC RADIOGRAPHIC IMAGE: HCPCS | Performed by: DENTIST

## 2025-03-06 PROCEDURE — D0220 INTRAORAL - PERIAPICAL FIRST RADIOGRAPHIC IMAGE: HCPCS | Performed by: DENTIST

## 2025-03-06 RX ORDER — AMOXICILLIN 500 MG/1
500 CAPSULE ORAL EVERY 8 HOURS SCHEDULED
Qty: 21 CAPSULE | Refills: 0 | Status: SHIPPED | OUTPATIENT
Start: 2025-03-06 | End: 2025-03-13

## 2025-03-06 NOTE — PROGRESS NOTES
Patient here for emergency dental exam; states tooth LL ; UL are hurting.  No significant allotted time in schedule for comp ex today.    Chief complaint -broke tooth one month ago and felt something crack; difficult to chew LLQ.                             - pt cannot differentiate UL or LL pain.  ASA II  Pain Scale 2  Past medical history see Epic  Radiographs - BW- broken filling 18-MO; 19-MO;                         PA #18- caries noted; no PAP ; no wide PDL; no root fracture.                        -Panorex: no PAP around erupted wisdom teeth; multiple missing;                                         Condyles wnl; Max sinuses clear.  Extraoral exam; pt states his face was swollen before but no swelling noted today. Pt eating and         drinking.                            - no trismus or TMJ pain at this appt.                            - Neg TMJ clicking or popping.  Intraoral exam :  Cl II open Bite ; generalized occlusal wear noted/                             -last dental visit unknown.                             #14- MO caries; #18-Mo caries; #19 Do caries noted.                           # 18/19- slight percussion sensitivity noted today.                                        - there is a broken filling #18; no tooth fracture at this appt.                                          Rec- #18/19- Endo test and R/o possible fracture.         - discussed possible RCT #18 or EXTr if symptoms persist.                           Next visit : 1) #18/19- endo test and Rule out tooth fracture.                         Comp exam/tx plan

## 2025-05-14 ENCOUNTER — OFFICE VISIT (OUTPATIENT)
Dept: DENTISTRY | Facility: CLINIC | Age: 40
End: 2025-05-14

## 2025-05-14 VITALS — TEMPERATURE: 97.9 F | HEART RATE: 51 BPM | SYSTOLIC BLOOD PRESSURE: 122 MMHG | DIASTOLIC BLOOD PRESSURE: 79 MMHG

## 2025-05-14 DIAGNOSIS — Z01.20 ENCOUNTER FOR DENTAL EXAMINATION: Primary | ICD-10-CM

## 2025-05-14 PROCEDURE — D0150 COMPREHENSIVE ORAL EVALUATION - NEW OR ESTABLISHED PATIENT: HCPCS

## 2025-05-14 PROCEDURE — D0274 BITEWINGS - 4 RADIOGRAPHIC IMAGES: HCPCS

## 2025-05-14 NOTE — PROGRESS NOTES
Comprehensive Exam    Kenyetta Damico 39 y.o. male presents with self to [facility] for comprehensive exam.  PMH reviewed, no changes, ASA II. Significant medical history: see chart. Significant allergies: NKA. Significant medications: See chart.  Pain level 0/10    Chief complaint:   My pain from before has gotten better    Consent:  Reviewed procedures involved with comprehensive exam including radiographs, oral exam, and periodontal probing.   Patient understands and consent was given by self via verbal consent.    Radiographs: Bitewings    Oral cancer screening: normal.  Extraoral exam: no remarkable findings.  Intraoral exam: no remarkable findings.    Periodontal exam:  Hygiene - Fair.  Plaque - Moderate.  Horizontal bone loss -  UR: Moderate (15-33%).  UL: Moderate (15-33%).  LL: Moderate (15-33%).  LR: Moderate (15-33%).  Vertical bone loss - None.  Subgingival calculus - Localized.  BOP - Localized.  Mobility - None.  Furcation involvements - None.  Occlusal trauma - None.  Smoker - No.  Diabetic - No.  Periodontal Stage: III.  Periodontal Grade: B.  Periodontal Plan: SRP: UL, UR, LL, and LR.    Caries exam:   Caries detected: see chart  Teeth with elevated chance of needing RCT: None  Likely nonrestorable teeth: None    Occlusal assessment:  Overbite - Open bite.  Overjet -  0 mm.  Supra-eruptions or drifting - None.  Crossbites - None.  Recommended for orthodontic referral? Yes.  Recommended for orthodontic consult at [facility]? No.    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     Tx plan:  Tx plan able to be partially determined at comp exam, starting with addressing the acute issue..  SRPs  Resins - start with #18MO (cold test prior to resin due hx of   symptoms)  Orthodontic referral due to anterior open bite following perio stability  Implant consultation for #13, 30; if pt defers #30 implant a bridge from #29-31 would be a good secondary option    Referral(s): None needed.  Rx: None.  Recommended recall schedule: 3 months.    Patient dismissed ambulatory and alert.    NV: SRPs.    Attending: Dr. Cooley was present in clinic.

## 2025-05-16 ENCOUNTER — OFFICE VISIT (OUTPATIENT)
Dept: DENTISTRY | Facility: CLINIC | Age: 40
End: 2025-05-16

## 2025-05-16 VITALS — SYSTOLIC BLOOD PRESSURE: 120 MMHG | DIASTOLIC BLOOD PRESSURE: 78 MMHG | HEART RATE: 78 BPM

## 2025-05-16 DIAGNOSIS — K05.6 PERIODONTAL DISEASE: Primary | ICD-10-CM

## 2025-05-16 PROCEDURE — D4341 PERIODONTAL SCALING AND ROOT PLANING - 4 OR MORE TEETH PER QUADRANT: HCPCS

## 2025-05-16 NOTE — PROGRESS NOTES
SCALE AND RP UR and LR        1 carpule/s given-  Oraqix 2.5%   CHIEF CONCERN: none   PAIN SCALE: 3  sens L side  has decay  ASA CLASS: ASA 2 - Patient with mild systemic disease with no functional limitations  PLAQUE:mild  CALCULUS: Moderate  BLEEDING:    light  STAIN : Light  PERIO: stage 2 grade B    Hand scaled, polished and flossed. Used cavitron    Oral Hygiene Instruction:  recommended brushing 2 x daily for 2 minutes MIN, recommended flossing daily, reviewed dietary precautions. Post op sc/rp instructions placed in AVS, printed and handed/ reviewed with patient.     Soft tissue exam:  soft tissue exam was normal  ExtraOral exam:   Extraoral exam was normal    REFERRALS: no referrals provided         NEXT VISIT:   --->sc/rp UL and sc/rp LL    He did well with Oraqix  he kept falling asleep !    Last BWX:  5/14/2025  Last Panorex/ FMX : 5/14/25 pan

## 2025-06-06 ENCOUNTER — OFFICE VISIT (OUTPATIENT)
Dept: DENTISTRY | Facility: CLINIC | Age: 40
End: 2025-06-06

## 2025-06-06 VITALS — HEART RATE: 84 BPM | SYSTOLIC BLOOD PRESSURE: 130 MMHG | DIASTOLIC BLOOD PRESSURE: 90 MMHG | TEMPERATURE: 98.4 F

## 2025-06-06 DIAGNOSIS — K02.9 DENTAL CARIES: Primary | ICD-10-CM

## 2025-06-06 PROCEDURE — D2392 RESIN-BASED COMPOSITE - 2 SURFACES, POSTERIOR: HCPCS

## 2025-06-06 PROCEDURE — D2391 RESIN-BASED COMPOSITE - 1 SURFACE, POSTERIOR: HCPCS

## 2025-06-06 NOTE — DENTAL PROCEDURE DETAILS
Composite Filling    Kenyetta FloodGuillerminarosemary presents for composite filling. PMH reviewed, no changes.    Discussed with patient need for RCT if pulp exposure occurs or in future if pulp is inflamed. Pt understands and consents.    Applied topical benzocaine, administered 1 carps 2% lido 1:100k epi via IANB    Prepped tooth #17 O, 18 MO with 245 carbide and 835 Zena on high speed. Caries removed with round carbide on slow speed. Placed Villaseñor matrix. Isolation with cotton rolls and dri-angles    Etch with 37% H2PO4, rinse, dry. Applied Adhese with 20 second scrub once, gentle air dry and light cured for 10s. Restored with Tetric bulk zuleyma shade A2 and light cured.    Refined with finishing burs, polished with enhance point. Verified occlusion and contacts. Pt left satisfied.    NV:   oCurtney, 45 min, #31 OL

## 2025-06-06 NOTE — PROGRESS NOTES
Procedure Details  17 O  - RESIN-BASED COMPOSITE - 1 SURFACE, POSTERIOR  18 MO  - RESIN-BASED COMPOSITE - 2 SURFACES, POSTERIOR  Composite Filling    Kenyetta Damico presents for composite filling. PMH reviewed, no changes.    Discussed with patient need for RCT if pulp exposure occurs or in future if pulp is inflamed. Pt understands and consents.    Applied topical benzocaine, administered 1 carps 2% lido 1:100k epi via IANB    Prepped tooth #17 O, 18 MO with 245 carbide and 835 Zena on high speed. Caries removed with round carbide on slow speed. Placed Villaseñor matrix. Isolation with cotton rolls and dri-angles    Etch with 37% H2PO4, rinse, dry. Applied Adhese with 20 second scrub once, gentle air dry and light cured for 10s. Restored with Tetric bulk zuleyma shade A2 and light cured.    Refined with finishing burs, polished with enhance point. Verified occlusion and contacts. Pt left satisfied.    NV:   Courtney, 45 min, #31 OL

## 2025-07-07 ENCOUNTER — OFFICE VISIT (OUTPATIENT)
Dept: DENTISTRY | Facility: CLINIC | Age: 40
End: 2025-07-07

## 2025-07-07 VITALS — HEART RATE: 69 BPM | SYSTOLIC BLOOD PRESSURE: 123 MMHG | DIASTOLIC BLOOD PRESSURE: 77 MMHG

## 2025-07-07 DIAGNOSIS — K05.6 PERIODONTAL DISEASE: Primary | ICD-10-CM

## 2025-07-07 PROCEDURE — D4341 PERIODONTAL SCALING AND ROOT PLANING - 4 OR MORE TEETH PER QUADRANT: HCPCS | Performed by: DENTAL HYGIENIST

## 2025-07-07 NOTE — PROGRESS NOTES
SCALE AND RP UL and LL      1 carpule/s given- Gingicaine topical anesthetic - pt did well    Reviewed M/DH  CHIEF CONCERN: none   PAIN SCALE: 0  ASA CLASS: ASA 1 - Normal health patient  PLAQUE:moderate  CALCULUS: Moderate  BLEEDING:    heavy  STAIN : Light  PERIO:  Mod gingivitis;  Mild localized bone loss and recession    Hand scaled, polished and flossed. Used cavitron    Oral Hygiene Instruction:  recommended brushing 2 x daily for 2 minutes MIN, recommended flossing daily, reviewed dietary precautions. Post op sc/rp instructions placed in AVS, printed and handed/ reviewed with patient.     Soft tissue exam:  soft tissue exam was normal  ExtraOral exam:   Extraoral exam was normal    REFERRALS: no referrals provided         NEXT VISIT:   NV1:  Perio main - 50 min - 3 months from 7/7/25    Last BWX:  5/14/25  Last Panorex:  3/6/25  Last FMX :

## 2025-07-11 NOTE — PROGRESS NOTES
7/14/2025      Chief Complaint   Patient presents with   • Follow-up     6 month follow up for hydrocele         Assessment and Plan    39 y.o. male managed by Ap team     1. Other hydrocele  Assessment & Plan:  History of bilateral inguinal hernia repair in 2009, and the other 2015.  US- 2025-Small to moderate left-sided hydrocele with echogenic debris.   Physical exam today reveals a  moderate left-sided hydrocele, no pain with palpation, no redness or induration.  I discussed with the patient that hydroceles are rarely a cause for concern and usually treatment is supportive. He is interested in surgical intervention should it get any bigger. I do not recommend surgical intervention at this point.  Continue with scrotal supportive underwear  and over-the-counter pain medications such as Tylenol and ibuprofen.  Plan to follow up as needed  If hydrocele were to increase in size or patient was to have persistent discomfort should schedule with MD for exam and hydrocelectomy discussion       History of Present Illness  Kenyetta Damico is a 39 y.o. male here for evaluation of left hydrocele.  Previously seen in consultation for this.  He did have an ultrasound done which showed small to moderate left-sided hydrocele.  Reports some mild pain that is intermittently.  No issues with urination.  Does report a history of bilateral inguinal hernia repairs 2009 in 2015.        Review of Systems   Constitutional:  Negative for chills and fever.   HENT:  Negative for ear pain and sore throat.    Eyes:  Negative for pain and visual disturbance.   Respiratory:  Negative for cough and shortness of breath.    Cardiovascular:  Negative for chest pain and palpitations.   Gastrointestinal:  Negative for abdominal pain and vomiting.   Genitourinary:  Negative for difficulty urinating, dysuria, flank pain, frequency, hematuria, penile pain, penile swelling, testicular pain and urgency.   Musculoskeletal:  Negative for arthralgias  "and back pain.   Skin:  Negative for color change and rash.   Neurological:  Negative for seizures and syncope.   All other systems reviewed and are negative.               Vitals  Vitals:    07/14/25 1546   BP: 110/70   BP Location: Left arm   Patient Position: Sitting   Cuff Size: Adult   Pulse: 56   SpO2: 97%   Weight: 98.4 kg (217 lb)   Height: 6' 1\" (1.854 m)       Physical Exam  Vitals reviewed.   Constitutional:       Appearance: Normal appearance.   HENT:      Head: Normocephalic and atraumatic.     Eyes:      Conjunctiva/sclera: Conjunctivae normal.     Pulmonary:      Effort: Pulmonary effort is normal.   Abdominal:      General: Abdomen is flat. There is no distension.      Palpations: Abdomen is soft.      Tenderness: There is no abdominal tenderness.   Genitourinary:     Penis: Uncircumcised.       Testes:         Right: Mass, tenderness, swelling or testicular hydrocele not present.         Left: Testicular hydrocele (moderate) present. Mass, tenderness or swelling not present.     Musculoskeletal:         General: Normal range of motion.      Cervical back: Normal range of motion.     Skin:     General: Skin is warm and dry.     Neurological:      General: No focal deficit present.      Mental Status: He is alert and oriented to person, place, and time.     Psychiatric:         Mood and Affect: Mood normal.         Behavior: Behavior normal.         Thought Content: Thought content normal.         Judgment: Judgment normal.           Past History  Past Medical History[1]  Social History[2]  Tobacco Use History[3]  Family History[4]    The following portions of the patient's history were reviewed and updated as appropriate: allergies, current medications, past medical history, past social history, past surgical history and problem list.    Results  No results found for this or any previous visit (from the past hour).]  No results found for: \"PSA\"  Lab Results   Component Value Date    CALCIUM 9.5 " "01/24/2025    K 4.5 01/24/2025    CO2 30 01/24/2025     01/24/2025    BUN 14 01/24/2025    CREATININE 0.87 01/24/2025     No results found for: \"WBC\", \"HGB\", \"HCT\", \"MCV\", \"PLT\"           [1]  No past medical history on file.[2]  Social History  Socioeconomic History   • Marital status: /Civil Union   Tobacco Use   • Smoking status: Never   • Smokeless tobacco: Never   Vaping Use   • Vaping status: Never Used   Substance and Sexual Activity   • Alcohol use: Never   • Drug use: Never   • Sexual activity: Yes     Partners: Female     Social Drivers of Health     Financial Resource Strain: Low Risk  (3/6/2025)    Overall Financial Resource Strain (CARDIA)    • Difficulty of Paying Living Expenses: Not very hard   Food Insecurity: No Food Insecurity (3/6/2025)    Nursing - Inadequate Food Risk Classification    • Worried About Running Out of Food in the Last Year: Never true    • Ran Out of Food in the Last Year: Never true   Transportation Needs: No Transportation Needs (3/6/2025)    PRAPARE - Transportation    • Lack of Transportation (Medical): No    • Lack of Transportation (Non-Medical): No   Housing Stability: Low Risk  (3/6/2025)    Housing Stability Vital Sign    • Unable to Pay for Housing in the Last Year: No    • Number of Times Moved in the Last Year: 1    • Homeless in the Last Year: No   [3]  Social History  Tobacco Use   Smoking Status Never   Smokeless Tobacco Never   [4]  Family History  Problem Relation Name Age of Onset   • Diabetes Father     • Other (leg amputation) Father     • Heart disease Mother     "

## 2025-07-14 ENCOUNTER — OFFICE VISIT (OUTPATIENT)
Dept: UROLOGY | Facility: MEDICAL CENTER | Age: 40
End: 2025-07-14

## 2025-07-14 VITALS
SYSTOLIC BLOOD PRESSURE: 110 MMHG | BODY MASS INDEX: 28.76 KG/M2 | WEIGHT: 217 LBS | HEIGHT: 73 IN | HEART RATE: 56 BPM | DIASTOLIC BLOOD PRESSURE: 70 MMHG | OXYGEN SATURATION: 97 %

## 2025-07-14 DIAGNOSIS — N43.2 OTHER HYDROCELE: Primary | ICD-10-CM

## 2025-07-14 PROCEDURE — 99213 OFFICE O/P EST LOW 20 MIN: CPT

## 2025-07-14 NOTE — ASSESSMENT & PLAN NOTE
History of bilateral inguinal hernia repair in 2009, and the other 2015.  US- 2025-Small to moderate left-sided hydrocele with echogenic debris.   Physical exam today reveals a  moderate left-sided hydrocele, no pain with palpation, no redness or induration.  I discussed with the patient that hydroceles are rarely a cause for concern and usually treatment is supportive. He is interested in surgical intervention should it get any bigger. I do not recommend surgical intervention at this point.  Continue with scrotal supportive underwear  and over-the-counter pain medications such as Tylenol and ibuprofen.  Plan to follow up as needed  If hydrocele were to increase in size or patient was to have persistent discomfort should schedule with MD for exam and hydrocelectomy discussion

## 2025-08-18 ENCOUNTER — OFFICE VISIT (OUTPATIENT)
Dept: DENTISTRY | Facility: CLINIC | Age: 40
End: 2025-08-18

## 2025-08-18 VITALS — SYSTOLIC BLOOD PRESSURE: 119 MMHG | HEART RATE: 62 BPM | DIASTOLIC BLOOD PRESSURE: 78 MMHG

## 2025-08-18 DIAGNOSIS — K02.9 CARIES: Primary | ICD-10-CM

## 2025-08-18 PROCEDURE — D2392 RESIN-BASED COMPOSITE - 2 SURFACES, POSTERIOR: HCPCS
